# Patient Record
Sex: MALE | Race: OTHER
[De-identification: names, ages, dates, MRNs, and addresses within clinical notes are randomized per-mention and may not be internally consistent; named-entity substitution may affect disease eponyms.]

---

## 2019-11-19 ENCOUNTER — APPOINTMENT (OUTPATIENT)
Dept: ENDOCRINOLOGY | Facility: CLINIC | Age: 59
End: 2019-11-19
Payer: COMMERCIAL

## 2019-11-19 VITALS
SYSTOLIC BLOOD PRESSURE: 118 MMHG | HEIGHT: 72 IN | WEIGHT: 170 LBS | DIASTOLIC BLOOD PRESSURE: 80 MMHG | OXYGEN SATURATION: 97 % | BODY MASS INDEX: 23.03 KG/M2 | HEART RATE: 74 BPM

## 2019-11-19 DIAGNOSIS — Z83.3 FAMILY HISTORY OF DIABETES MELLITUS: ICD-10-CM

## 2019-11-19 DIAGNOSIS — Z80.0 FAMILY HISTORY OF MALIGNANT NEOPLASM OF DIGESTIVE ORGANS: ICD-10-CM

## 2019-11-19 DIAGNOSIS — Z80.3 FAMILY HISTORY OF MALIGNANT NEOPLASM OF BREAST: ICD-10-CM

## 2019-11-19 DIAGNOSIS — Z80.9 FAMILY HISTORY OF MALIGNANT NEOPLASM, UNSPECIFIED: ICD-10-CM

## 2019-11-19 DIAGNOSIS — Z78.9 OTHER SPECIFIED HEALTH STATUS: ICD-10-CM

## 2019-11-19 LAB — GLUCOSE BLDC GLUCOMTR-MCNC: 122

## 2019-11-19 PROCEDURE — 99205 OFFICE O/P NEW HI 60 MIN: CPT

## 2019-11-19 RX ORDER — IRBESARTAN 300 MG/1
TABLET, FILM COATED ORAL
Refills: 0 | Status: ACTIVE | COMMUNITY

## 2019-11-19 RX ORDER — BLOOD SUGAR DIAGNOSTIC
STRIP MISCELLANEOUS TWICE DAILY
Qty: 2 | Refills: 3 | Status: ACTIVE | COMMUNITY
Start: 2019-11-19 | End: 1900-01-01

## 2019-11-19 RX ORDER — BLOOD-GLUCOSE METER
W/DEVICE EACH MISCELLANEOUS
Qty: 1 | Refills: 0 | Status: ACTIVE | COMMUNITY
Start: 2019-11-19 | End: 1900-01-01

## 2019-11-19 RX ORDER — LANCETS 33 GAUGE
EACH MISCELLANEOUS
Qty: 1 | Refills: 0 | Status: ACTIVE | COMMUNITY
Start: 2019-11-19 | End: 1900-01-01

## 2019-11-19 RX ORDER — BLOOD-GLUCOSE METER
70 EACH MISCELLANEOUS
Qty: 1 | Refills: 3 | Status: ACTIVE | COMMUNITY
Start: 2019-11-19 | End: 1900-01-01

## 2019-11-19 NOTE — CONSULT LETTER
[Dear  ___] : Dear  [unfilled], [Consult Closing:] : Thank you very much for allowing me to participate in the care of this patient.  If you have any questions, please do not hesitate to contact me. [Please see my note below.] : Please see my note below. [Consult Letter:] : I had the pleasure of evaluating your patient, [unfilled]. [Sincerely,] : Sincerely, [FreeTextEntry3] : Angela Marie MD

## 2019-11-19 NOTE — PHYSICAL EXAM
[Alert] : alert [Well Nourished] : well nourished [No Acute Distress] : no acute distress [Well Developed] : well developed [Normal Rate and Effort] : normal respiratory rhythm and effort [EOMI] : extra ocular movement intact [No Respiratory Distress] : no respiratory distress [Normal Hearing] : hearing was normal [Clear to Auscultation] : lungs were clear to auscultation bilaterally [No Accessory Muscle Use] : no accessory muscle use [Normal Rate] : heart rate was normal  [Normal S1, S2] : normal S1 and S2 [Normal Bowel Sounds] : normal bowel sounds [Regular Rhythm] : with a regular rhythm [Not Tender] : non-tender [Normal Gait] : normal gait [No Joint Swelling] : no joint swelling seen [Soft] : abdomen soft [Foot Ulcers] : no foot ulcers [No Rash] : no rash [Normal Strength/Tone] : muscle strength and tone were normal [Right Foot Was Examined] : right foot ~C was examined [Left Foot Was Examined] : left foot ~C was examined [Tenderness] : tender [Normal] : normal [Swelling] : not swollen [2+] : 2+ in the posterior tibialis [Erythema] : not erythematous [#2 Diminished] : number 2 was normal [#3 Diminished] : number 3 was normal [#1 Diminished] : number 1 was normal [#4 Diminished] : number 4 was normal [#5 Diminished] : number 5 was normal [#8 Diminished] : number 8 was normal [#7 Diminished] : number 7 was normal [#6 Diminished] : number 6 was normal [#9 Diminished] : number 9 was normal [#10 Diminished] : number 10 was normal

## 2019-11-19 NOTE — HISTORY OF PRESENT ILLNESS
[FreeTextEntry1] : Patient is a 60 yo man with type 2 diabetes establishing endocrine care today\par \par Diabetes diagnosed 10 years ago on routine labs.  Dr. 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  Will eat Greek food, Italian, steakhouses about 3 times  a week. Eats dinner around 7 during the week and 8-9 PM on the weekends\par Snacks: yogurt at night or popcorn\par Prior to six weeks ago was eating candy, cakes, and fast food. Has a sweet tooth\par Patient does not cook, wife cooks\par Never overweight\par Goes to the gym, has a  twice a week\par Last A1c was 8.8%

## 2019-11-19 NOTE — ASSESSMENT
[FreeTextEntry1] : Patient is a 58 yo man with uncontrolled T2DM, HTN, HLD establishing new endocrine care\par \par 1. Uncontrolled T2DM (A1c of 8.8% October)\par -extensive discussion regarding the importance of glycemic control and potential risks of micro/macrovascular disease\par -recommend checking fingersticks intermittently at various times of the day. Glucometer sent to pharmacy along with supplies\par -A1c too early to check at this time, lab slips dated for January given today\par -educated about carbohydrate/sugar limits and decreasing restaurant foods if possible\par -nutrition consultation\par -offered zonia but declined as we were unsure whether insurance would cover professional zonia for this visit\par -up to date with dilated eye exam\par -monofilament testing normal today\par \par 2. HTN\par -BP goal < 140/90\par \par 3. HLD\par -LDL goal 70\par -repeat in January\par -statin\par \par Follow up in 3 months [Carbohydrate Consistent Diet] : carbohydrate consistent diet [Diabetes Foot Care] : diabetes foot care [Long Term Vascular Complications] : long term vascular complications of diabetes [Importance of Diet and Exercise] : importance of diet and exercise to improve glycemic control, achieve weight loss and improve cardiovascular health [Retinopathy Screening] : Patient was referred to ophthalmology for retinopathy screening [Self Monitoring of Blood Glucose] : self monitoring of blood glucose

## 2019-11-19 NOTE — REVIEW OF SYSTEMS
[Negative] : Eyes [All other systems negative] : All other systems negative [Fatigue] : no fatigue [Decreased Appetite] : appetite not decreased [Visual Field Defect] : no visual field defect [Neck Pain] : no neck pain [Blurry Vision] : no blurred vision [Chest Pain] : no chest pain [Palpitations] : no palpitations [Nasal Congestion] : no nasal congestion [Shortness Of Breath] : no shortness of breath [Heart Rate Is Fast] : the heart rate was not fast [Heart Rate Is Slow] : the heart rate was not slow [Cough] : no cough [Wheezing] : no wheezing was heard [Nausea] : no nausea [Vomiting] : no vomiting was observed [SOB on Exertion] : no shortness of breath during exertion [Constipation] : no constipation [Headache] : no headaches [Diarrhea] : no diarrhea [Depression] : no depression [Tremors] : no tremors [Anxiety] : no anxiety

## 2020-01-08 ENCOUNTER — APPOINTMENT (OUTPATIENT)
Dept: ENDOCRINOLOGY | Facility: CLINIC | Age: 60
End: 2020-01-08

## 2020-02-13 ENCOUNTER — APPOINTMENT (OUTPATIENT)
Dept: ENDOCRINOLOGY | Facility: CLINIC | Age: 60
End: 2020-02-13

## 2020-05-29 ENCOUNTER — TRANSCRIPTION ENCOUNTER (OUTPATIENT)
Age: 60
End: 2020-05-29

## 2020-08-14 ENCOUNTER — APPOINTMENT (OUTPATIENT)
Dept: ENDOCRINOLOGY | Facility: CLINIC | Age: 60
End: 2020-08-14
Payer: COMMERCIAL

## 2020-08-14 ENCOUNTER — RESULT CHARGE (OUTPATIENT)
Age: 60
End: 2020-08-14

## 2020-08-14 VITALS
DIASTOLIC BLOOD PRESSURE: 72 MMHG | SYSTOLIC BLOOD PRESSURE: 102 MMHG | HEART RATE: 72 BPM | BODY MASS INDEX: 22.75 KG/M2 | TEMPERATURE: 96.4 F | OXYGEN SATURATION: 98 % | WEIGHT: 168 LBS | HEIGHT: 72 IN

## 2020-08-14 LAB
GLUCOSE BLDC GLUCOMTR-MCNC: 157
HBA1C MFR BLD HPLC: 7.4

## 2020-08-14 PROCEDURE — 83036 HEMOGLOBIN GLYCOSYLATED A1C: CPT | Mod: QW

## 2020-08-14 PROCEDURE — 99214 OFFICE O/P EST MOD 30 MIN: CPT | Mod: 25

## 2020-08-14 PROCEDURE — 82962 GLUCOSE BLOOD TEST: CPT

## 2020-08-14 NOTE — PHYSICAL EXAM
[Alert] : alert [Well Nourished] : well nourished [Healthy Appearance] : healthy appearance [EOMI] : extra ocular movement intact [Normal Hearing] : hearing was normal [No Respiratory Distress] : no respiratory distress [No Accessory Muscle Use] : no accessory muscle use [Normal Rate and Effort] : normal respiratory rate and effort [Normal Rate] : heart rate was normal [Normal Gait] : normal gait [No Motor Deficits] : the motor exam was normal [Normal Affect] : the affect was normal [Normal Insight/Judgement] : insight and judgment were intact [Normal Mood] : the mood was normal

## 2020-08-14 NOTE — HISTORY OF PRESENT ILLNESS
[FreeTextEntry1] : Patient is a 59 yo man with type 2 diabetes, last visit November 2019, here for follow up\par \par Diabetes diagnosed 10 years ago on routine labs. Dr. Serna was previous endocrinologist- looking to change care.\par Dilated eye exam: early retinopathy, one year ago\par Current diabetes medicine is Kombiglyze twice a day\par Does not check sugars, knows how to check sugars in theory\par Breakfast: peanut butter sandwich on potato bread; Cheerios, banana\par Lunch: sandwich, processed meat\par Dinner: pasta + chicken Parmesan\par Snacks: yogurt at night or popcorn, apple; rare desserts but occasional sweets\par "I'm so weak, when I eat healthy, I lose weight."\par Patient does not cook, wife cooks\par Never overweight\par Goes to the gym, has a  twice a week\par Last A1c was 8.8% \par \par HLD: lipitor\par

## 2020-08-14 NOTE — ASSESSMENT
[FreeTextEntry1] : Patient is a 61 yo man with type 2 diabetes, HTN and HLD\par \par 1. T2DM\par -patient's A1c on POCT is 7.4%, confirm with serum\par -diet is very liberal and includes a lot of carbohydrates, processed foods\par -although A1c is relatively at goal, educated about the risks of medicines and ultimate goal of coming off diabetes medicines and maintaining health with healthy diet\par -nutrition education to be scheduled\par -requires annual dilated eye exam\par -check microalbumin/creatinine today\par -check CMP\par -risk of GI side effects and hypoglycemia with medicine Kombiglyze discussed\par \par 2. HLD\par -check lipid profile\par -continue with statin\par \par 3. HTN\par -BP goal < 140/90\par \par He is aware I am leaving the practice. Follow up with new 865 doctor in four months [Long Term Vascular Complications] : long term vascular complications of diabetes [Carbohydrate Consistent Diet] : carbohydrate consistent diet [Importance of Diet and Exercise] : importance of diet and exercise to improve glycemic control, achieve weight loss and improve cardiovascular health [Exercise/Effect on Glucose] : exercise/effect on glucose [Hypoglycemia Management] : hypoglycemia management [Self Monitoring of Blood Glucose] : self monitoring of blood glucose [Retinopathy Screening] : Patient was referred to ophthalmology for retinopathy screening

## 2020-08-14 NOTE — REVIEW OF SYSTEMS
[Fatigue] : no fatigue [Decreased Appetite] : appetite not decreased [Dysphagia] : no dysphagia [Dysphonia] : no dysphonia [Chest Pain] : no chest pain [Slow Heart Rate] : heart rate is not slow [Palpitations] : no palpitations [Fast Heart Rate] : heart rate is not fast [Nausea] : no nausea [Constipation] : no constipation [Vomiting] : no vomiting [Diarrhea] : no diarrhea [Polyuria] : no polyuria [Joint Pain] : no joint pain

## 2020-08-17 LAB
ALBUMIN SERPL ELPH-MCNC: 5.2 G/DL
ALP BLD-CCNC: 79 U/L
ALT SERPL-CCNC: 34 U/L
ANION GAP SERPL CALC-SCNC: 14 MMOL/L
AST SERPL-CCNC: 21 U/L
BILIRUB SERPL-MCNC: 0.9 MG/DL
BUN SERPL-MCNC: 17 MG/DL
CALCIUM SERPL-MCNC: 9.9 MG/DL
CHLORIDE SERPL-SCNC: 99 MMOL/L
CHOLEST SERPL-MCNC: 179 MG/DL
CHOLEST/HDLC SERPL: 3.4 RATIO
CO2 SERPL-SCNC: 26 MMOL/L
CREAT SERPL-MCNC: 0.92 MG/DL
CREAT SPEC-SCNC: 151 MG/DL
ESTIMATED AVERAGE GLUCOSE: 177 MG/DL
GLUCOSE SERPL-MCNC: 171 MG/DL
HBA1C MFR BLD HPLC: 7.8 %
HDLC SERPL-MCNC: 52 MG/DL
LDLC SERPL CALC-MCNC: 99 MG/DL
MICROALBUMIN 24H UR DL<=1MG/L-MCNC: 2 MG/DL
MICROALBUMIN/CREAT 24H UR-RTO: 13 MG/G
POTASSIUM SERPL-SCNC: 4.7 MMOL/L
PROT SERPL-MCNC: 7.6 G/DL
SODIUM SERPL-SCNC: 138 MMOL/L
TRIGL SERPL-MCNC: 141 MG/DL

## 2020-09-28 ENCOUNTER — APPOINTMENT (OUTPATIENT)
Dept: ENDOCRINOLOGY | Facility: CLINIC | Age: 60
End: 2020-09-28

## 2020-12-02 ENCOUNTER — APPOINTMENT (OUTPATIENT)
Dept: ENDOCRINOLOGY | Facility: CLINIC | Age: 60
End: 2020-12-02
Payer: COMMERCIAL

## 2020-12-02 VITALS
TEMPERATURE: 97.3 F | SYSTOLIC BLOOD PRESSURE: 131 MMHG | OXYGEN SATURATION: 99 % | HEIGHT: 72 IN | HEART RATE: 72 BPM | DIASTOLIC BLOOD PRESSURE: 83 MMHG | WEIGHT: 170 LBS | BODY MASS INDEX: 23.03 KG/M2

## 2020-12-02 LAB — HBA1C MFR BLD HPLC: 7

## 2020-12-02 PROCEDURE — 99214 OFFICE O/P EST MOD 30 MIN: CPT | Mod: 25

## 2020-12-02 PROCEDURE — 83036 HEMOGLOBIN GLYCOSYLATED A1C: CPT | Mod: QW

## 2020-12-02 PROCEDURE — 99072 ADDL SUPL MATRL&STAF TM PHE: CPT

## 2020-12-02 NOTE — PHYSICAL EXAM
[Alert] : alert [Well Nourished] : well nourished [No Acute Distress] : no acute distress [EOMI] : extra ocular movement intact [Normal Hearing] : hearing was normal [No Respiratory Distress] : no respiratory distress [No Accessory Muscle Use] : no accessory muscle use [Clear to Auscultation] : lungs were clear to auscultation bilaterally [Normal to Percussion] : lungs were normal to percussion [Normal S1, S2] : normal S1 and S2 [Normal Rate] : heart rate was normal [Normal Bowel Sounds] : normal bowel sounds [Soft] : abdomen soft [Normal Gait] : normal gait [No Joint Swelling] : no joint swelling seen [Normal Strength/Tone] : muscle strength and tone were normal [No Motor Deficits] : the motor exam was normal [Normal Affect] : the affect was normal [Normal Insight/Judgement] : insight and judgment were intact [Normal Mood] : the mood was normal [de-identified] : ? left thyroid nodule

## 2020-12-02 NOTE — HISTORY OF PRESENT ILLNESS
[FreeTextEntry1] : Patient is a 61 yo man with type 2 diabetes here for follow up\par \par Diabetes diagnosed 10 years ago on routine labs. Dr. Serna was previous endocrinologist\par Dilated eye exam: early retinopathy, one year ago\par Current diabetes medicine is Kombiglyze twice a day\par Does not check sugars, knows how to check sugars in theory. States "I just can't"\par Breakfast: black coffee with splenda and a "diet" chocolate muffin\par Lunch: home made sandwich, chicken cutlet on Qinti bread\par Dinner: lentil soup, Hawaiian chicken\par Snacks: apple, banana\par States diet hasn't been the healthiest.  Has a sweet tooth and likes anything sweet.  After dinner he "has to have something sweet."\par Patient does not cook, wife cooks\par POCT A1c in August was 7.4%, serum A1c was 7.8%\par Has not had a dilated eye exam yet thought he knows to go \par \par HLD: lipitor\par

## 2020-12-02 NOTE — ASSESSMENT
[FreeTextEntry1] : Patient is a 59 yo man with type 2 diabetes, HTN, HLD and possible left thyroid nodule\par \par 1. Type 2 diabetes\par -POCT A1c today is 7% which is improved since the last visit. Confirm with serum A1c\par -had no nephropathy on screening and in an effort to decrease pill burden can do a trial off of avapro\par -continue with Kombiglyze. If serum A1c is at goal of 7%, no addition of medicines needed\par -educated about limiting sweets. Diet over all is not bad but the sweet tooth can be a problem.  Would limit things like muffins if possible, optimize diets and have complex carbohydrates\par -requires dilated eye exam\par -monofilament testing was done and normal at the last visit\par -microalbumin/creatinine level was checked this year, repeat next year\par \par 2. HTN\par -BP at goal \par \par 3. HLD\par -statin\par \par 4. Possible left thyroid nodule on exam\par -check TSH \par -refer for thyroid US\par \par Follow up in 4 months, sooner if needed [Diabetes Foot Care] : diabetes foot care [Long Term Vascular Complications] : long term vascular complications of diabetes [Carbohydrate Consistent Diet] : carbohydrate consistent diet [Importance of Diet and Exercise] : importance of diet and exercise to improve glycemic control, achieve weight loss and improve cardiovascular health [Exercise/Effect on Glucose] : exercise/effect on glucose [Self Monitoring of Blood Glucose] : self monitoring of blood glucose [Retinopathy Screening] : Patient was referred to ophthalmology for retinopathy screening

## 2020-12-02 NOTE — REVIEW OF SYSTEMS
[Fatigue] : no fatigue [Decreased Appetite] : appetite not decreased [Dysphagia] : no dysphagia [Dysphonia] : no dysphonia [Chest Pain] : no chest pain [Slow Heart Rate] : heart rate is not slow [Palpitations] : no palpitations [Fast Heart Rate] : heart rate is not fast [Nausea] : no nausea [Constipation] : no constipation [Vomiting] : no vomiting [Headaches] : no headaches [Tremors] : no tremors [Depression] : no depression [Stress] : no stress [Cold Intolerance] : no cold intolerance [Heat Intolerance] : no heat intolerance

## 2020-12-03 LAB
ALBUMIN SERPL ELPH-MCNC: 5.2 G/DL
ALP BLD-CCNC: 79 U/L
ALT SERPL-CCNC: 28 U/L
ANION GAP SERPL CALC-SCNC: 21 MMOL/L
AST SERPL-CCNC: 24 U/L
BILIRUB SERPL-MCNC: 0.6 MG/DL
BUN SERPL-MCNC: 15 MG/DL
CALCIUM SERPL-MCNC: 10.2 MG/DL
CHLORIDE SERPL-SCNC: 94 MMOL/L
CHOLEST SERPL-MCNC: 185 MG/DL
CO2 SERPL-SCNC: 22 MMOL/L
CREAT SERPL-MCNC: 0.93 MG/DL
ESTIMATED AVERAGE GLUCOSE: 169 MG/DL
GLUCOSE SERPL-MCNC: 107 MG/DL
HBA1C MFR BLD HPLC: 7.5 %
HDLC SERPL-MCNC: 51 MG/DL
LDLC SERPL CALC-MCNC: 100 MG/DL
NONHDLC SERPL-MCNC: 134 MG/DL
POTASSIUM SERPL-SCNC: 4.4 MMOL/L
PROT SERPL-MCNC: 7.9 G/DL
SODIUM SERPL-SCNC: 138 MMOL/L
T3 SERPL-MCNC: 87 NG/DL
T4 FREE SERPL-MCNC: 1.6 NG/DL
TRIGL SERPL-MCNC: 166 MG/DL
TSH SERPL-ACNC: 1 UIU/ML

## 2020-12-07 ENCOUNTER — RESULT REVIEW (OUTPATIENT)
Age: 60
End: 2020-12-07

## 2020-12-07 ENCOUNTER — OUTPATIENT (OUTPATIENT)
Dept: OUTPATIENT SERVICES | Facility: HOSPITAL | Age: 60
LOS: 1 days | End: 2020-12-07
Payer: COMMERCIAL

## 2020-12-07 ENCOUNTER — APPOINTMENT (OUTPATIENT)
Dept: ULTRASOUND IMAGING | Facility: CLINIC | Age: 60
End: 2020-12-07
Payer: COMMERCIAL

## 2020-12-07 DIAGNOSIS — Z00.8 ENCOUNTER FOR OTHER GENERAL EXAMINATION: ICD-10-CM

## 2020-12-07 PROCEDURE — 76536 US EXAM OF HEAD AND NECK: CPT | Mod: 26

## 2020-12-07 PROCEDURE — 76536 US EXAM OF HEAD AND NECK: CPT

## 2020-12-14 ENCOUNTER — APPOINTMENT (OUTPATIENT)
Dept: ENDOCRINOLOGY | Facility: CLINIC | Age: 60
End: 2020-12-14

## 2021-04-06 ENCOUNTER — APPOINTMENT (OUTPATIENT)
Dept: ENDOCRINOLOGY | Facility: CLINIC | Age: 61
End: 2021-04-06

## 2021-04-06 ENCOUNTER — APPOINTMENT (OUTPATIENT)
Dept: ENDOCRINOLOGY | Facility: CLINIC | Age: 61
End: 2021-04-06
Payer: COMMERCIAL

## 2021-04-06 VITALS
OXYGEN SATURATION: 96 % | HEART RATE: 81 BPM | HEIGHT: 72 IN | TEMPERATURE: 97.7 F | SYSTOLIC BLOOD PRESSURE: 128 MMHG | DIASTOLIC BLOOD PRESSURE: 79 MMHG | WEIGHT: 169 LBS | BODY MASS INDEX: 22.89 KG/M2

## 2021-04-06 LAB — HBA1C MFR BLD HPLC: 8.7

## 2021-04-06 PROCEDURE — 83036 HEMOGLOBIN GLYCOSYLATED A1C: CPT | Mod: QW

## 2021-04-06 PROCEDURE — 99215 OFFICE O/P EST HI 40 MIN: CPT | Mod: 25

## 2021-04-06 PROCEDURE — 99072 ADDL SUPL MATRL&STAF TM PHE: CPT

## 2021-04-06 NOTE — ASSESSMENT
[FreeTextEntry1] : Patient is a 40 yo man with type 2 diabetes, HLD and thyroid nodule here for follow up\par \par 1. Type 2 diabetes\par -POCT A1c today is 8.7% with admittedly poor diet\par -discussed the risks of uncontrolled diabetes at length and offered nutrition counseling\par -schedule CDE visit\par -patient does not check fingersticks\par -his wife states patient has had some unintentional weight loss.  Patient has PCP visit with Dr. Shah and would consider age appropriate cancer screening, possible CT scan\par -will check c-peptide levels today\par -confirm serum A1c, serum CMP and repeat microalbumin/creatinine ratio\par -to date, behavioral modifications trials has not aided in achieving glycemic goals\par -will add glipizide daily.  Hypoglycemia risks discussed. Continue with Kombiglyze. Offered SLGT 2 inhibitors but he had increased urinary frequency with jardiance in the past and declines a retrial\par \par 2. Left thyroid nodule\par -patient has a 1 cm isoechoic right thyroid nodule with a left lobe that is homogenous no distinct nodule\par -repeat thyroid US this fall for active surveillance\par -clinically and chemically euthyroid\par \par 3. HLD\par -recommended statin dose is 40 mg but patient was only taking half a pill\par -increase to 40 mg as discussed\par -LDL goal <70\par \par Follow up in 3 months, [address] [place] [address] tasked to schedule nutrition visit. [Diabetes Foot Care] : diabetes foot care [Long Term Vascular Complications] : long term vascular complications of diabetes [Carbohydrate Consistent Diet] : carbohydrate consistent diet [Importance of Diet and Exercise] : importance of diet and exercise to improve glycemic control, achieve weight loss and improve cardiovascular health [Exercise/Effect on Glucose] : exercise/effect on glucose [Self Monitoring of Blood Glucose] : self monitoring of blood glucose [Retinopathy Screening] : Patient was referred to ophthalmology for retinopathy screening

## 2021-04-06 NOTE — HISTORY OF PRESENT ILLNESS
[FreeTextEntry1] : Patient is a 62 yo man with type 2 diabetes and thyroid nodule here for follow up\par \par Diabetes diagnosed around 2010 on routine labs. Dr. Serna was previous endocrinologist\par Dilated eye exam: early retinopathy, one year ago\par Current diabetes medicine is Kombiglyze (saxaglipitin/metformin) twice a day. Had tried Jardiance in the past but caused increase urination.  \par Does not check sugars, knows how to check sugars in theory. States "I just can't"\par Is not eating a lot but "eating poorly."  He has been told that he looks thin but feels fine.  Father had colon cancer at 56 and has maintained colon cancer screenings. Appetite is unchanged, no stomach ache. Never a smoker. Weight is around 163 and 166 on home scale. Has PCP visit with Dr. Ming Shah coming up April 14.\par Breakfast: black coffee with milk, no sweetener, apple\par Lunch: home made sandwich, crispy chicken sandwich from a cafe; occasional [employer] a twice a month; french fries on the side.  Ate about 1/2 the fries and wife ate the other half\par Dinner: lentil soup, Hawaiian chicken\par Snacks: apple, banana\par Sneaking in some mini muffins and desserts\par Patient does not cook, wife cooks\par POCT A1c in August was 7.4%, serum A1c was 7.5% in December. Offered additive diabetes medicines to achieve goal A1c of 7% but patient declined in favor of behavioral modifications.\par December 3,2021 \par A1c 7.5%\par \par HLD: \par LDL in December was above the goal of 70 at 135 and atorvastatin was increased from 10 to 40 mg.  States he is taking 1/2 pill and will start to take 40 mg from today\par \par Thyroid nodule\par Patient with a 1 x 0.7 x 0.9 cm isoechoic right midpole thyroid nodule\par Clinically and chemically euthyroid in December 2020

## 2021-04-06 NOTE — PHYSICAL EXAM
[Alert] : alert [Well Nourished] : well nourished [No Acute Distress] : no acute distress [EOMI] : extra ocular movement intact [Normal Hearing] : hearing was normal [No Respiratory Distress] : no respiratory distress [No Accessory Muscle Use] : no accessory muscle use [Clear to Auscultation] : lungs were clear to auscultation bilaterally [Normal S1, S2] : normal S1 and S2 [Normal Rate] : heart rate was normal [Normal Bowel Sounds] : normal bowel sounds [Not Tender] : non-tender [Soft] : abdomen soft [No Stigmata of Cushings Syndrome] : no stigmata of Cushings Syndrome [Normal Gait] : normal gait [Foot Ulcers] : no foot ulcers [Right Foot Was Examined] : right foot ~C was examined [Left Foot Was Examined] : left foot ~C was examined [Normal] : normal [Full ROM] : with full range of motion [Swelling] : not swollen [Tenderness] : not tender [Erythema] : not erythematous [2+] : 2+ in the dorsalis pedis [#1 Diminished] : number 1 was normal [#2 Diminished] : number 2 was normal [#3 Diminished] : number 3 was normal [#4 Diminished] : number 4 was normal [#5 Diminished] : number 5 was normal [#6 Diminished] : number 6 was normal [#7 Diminished] : number 7 was normal [#8 Diminished] : number 8 was normal [#9 Diminished] : number 9 was normal [#10 Diminished] : number 10 was normal [No Motor Deficits] : the motor exam was normal [Normal Affect] : the affect was normal [Normal Insight/Judgement] : insight and judgment were intact [Normal Mood] : the mood was normal

## 2021-04-07 LAB
ALBUMIN SERPL ELPH-MCNC: 5 G/DL
ALP BLD-CCNC: 89 U/L
ALT SERPL-CCNC: 24 U/L
ANION GAP SERPL CALC-SCNC: 16 MMOL/L
AST SERPL-CCNC: 20 U/L
BILIRUB SERPL-MCNC: 0.9 MG/DL
BUN SERPL-MCNC: 16 MG/DL
C PEPTIDE SERPL-MCNC: 5.2 NG/ML
CALCIUM SERPL-MCNC: 10.2 MG/DL
CHLORIDE SERPL-SCNC: 97 MMOL/L
CHOLEST SERPL-MCNC: 149 MG/DL
CO2 SERPL-SCNC: 27 MMOL/L
CREAT SERPL-MCNC: 1.03 MG/DL
CREAT SPEC-SCNC: 517 MG/DL
ESTIMATED AVERAGE GLUCOSE: 223 MG/DL
GLUCOSE SERPL-MCNC: 296 MG/DL
HBA1C MFR BLD HPLC: 9.4 %
HDLC SERPL-MCNC: 45 MG/DL
LDLC SERPL CALC-MCNC: 66 MG/DL
MICROALBUMIN 24H UR DL<=1MG/L-MCNC: 9.9 MG/DL
MICROALBUMIN/CREAT 24H UR-RTO: 19 MG/G
NONHDLC SERPL-MCNC: 104 MG/DL
POTASSIUM SERPL-SCNC: 4.5 MMOL/L
PROT SERPL-MCNC: 7.3 G/DL
SODIUM SERPL-SCNC: 140 MMOL/L
T4 FREE SERPL-MCNC: 1.6 NG/DL
TRIGL SERPL-MCNC: 188 MG/DL
TSH SERPL-ACNC: 0.91 UIU/ML

## 2021-04-10 ENCOUNTER — APPOINTMENT (OUTPATIENT)
Dept: CT IMAGING | Facility: CLINIC | Age: 61
End: 2021-04-10

## 2021-04-26 ENCOUNTER — APPOINTMENT (OUTPATIENT)
Dept: ENDOCRINOLOGY | Facility: CLINIC | Age: 61
End: 2021-04-26
Payer: COMMERCIAL

## 2021-04-26 PROCEDURE — 97802 MEDICAL NUTRITION INDIV IN: CPT | Mod: 95

## 2021-06-21 ENCOUNTER — APPOINTMENT (OUTPATIENT)
Dept: ENDOCRINOLOGY | Facility: CLINIC | Age: 61
End: 2021-06-21

## 2021-08-24 ENCOUNTER — APPOINTMENT (OUTPATIENT)
Dept: ENDOCRINOLOGY | Facility: CLINIC | Age: 61
End: 2021-08-24
Payer: COMMERCIAL

## 2021-08-24 VITALS
HEART RATE: 84 BPM | WEIGHT: 164 LBS | BODY MASS INDEX: 22.21 KG/M2 | TEMPERATURE: 97.2 F | HEIGHT: 72 IN | DIASTOLIC BLOOD PRESSURE: 99 MMHG | SYSTOLIC BLOOD PRESSURE: 144 MMHG

## 2021-08-24 LAB — HBA1C MFR BLD HPLC: 8.3

## 2021-08-24 PROCEDURE — 99214 OFFICE O/P EST MOD 30 MIN: CPT

## 2021-08-24 RX ORDER — GLIPIZIDE 2.5 MG/1
2.5 TABLET, EXTENDED RELEASE ORAL DAILY
Qty: 90 | Refills: 3 | Status: DISCONTINUED | COMMUNITY
Start: 2021-04-06 | End: 2021-08-24

## 2021-08-24 NOTE — REVIEW OF SYSTEMS
[Fatigue] : no fatigue [Decreased Appetite] : appetite not decreased [Visual Field Defect] : no visual field defect [Dysphagia] : no dysphagia [Dysphonia] : no dysphonia [Chest Pain] : no chest pain [Palpitations] : no palpitations [Nausea] : no nausea [Vomiting] : no vomiting [Joint Pain] : no joint pain [Depression] : no depression

## 2021-08-24 NOTE — PHYSICAL EXAM
[Alert] : alert [No Acute Distress] : no acute distress [EOMI] : extra ocular movement intact [Normal Hearing] : hearing was normal [Thyroid Not Enlarged] : the thyroid was not enlarged [No Respiratory Distress] : no respiratory distress [No Accessory Muscle Use] : no accessory muscle use [Clear to Auscultation] : lungs were clear to auscultation bilaterally [Normal S1, S2] : normal S1 and S2 [Normal Rate] : heart rate was normal [No Edema] : no peripheral edema [Normal Bowel Sounds] : normal bowel sounds [Soft] : abdomen soft [Not Tender] : non-tender [No Motor Deficits] : the motor exam was normal [Normal Affect] : the affect was normal [Normal Insight/Judgement] : insight and judgment were intact [Normal Mood] : the mood was normal

## 2021-08-24 NOTE — ASSESSMENT
[FreeTextEntry1] : Patient is a 60 yo man with type 2 diabetes, HLD and thyroid nodule here for follow up\par \par 1. Type 2 diabetes\par -POCT A1c today is 8.3% improved from last visit yet not at goal\par -discussed the risks of uncontrolled diabetes at length and offered nutrition counseling. He had telehealth nutritional counseling but states it was not helpful\par -patient does not check fingersticks; encouraged him to do so\par -states that he was watching diet strictly for a few months but had more liberal food choices recently\par -check serum A1c, serum CMP and repeat microalbumin/creatinine ratio at next visit\par -to date, behavioral modifications trials has not aided in achieving glycemic goals\par -non-adherent to glipizide daily-admits to taking it only twice a week, can stop at this point. Continue with Kombiglyze. Offered SLGT 2 inhibitors and retrial of low dose jardiance 10 mg. He is amenable.  Discussed side effects including increased urination which he experienced in the past. Recommend he take it when he gets home from work and has access to bathroom nearby.  If Jardiance does not work, we are running out of options as he is already on three classes.  One possibility would be actos.  We can consider this if A1c is not at goal\par \par 2. Left thyroid nodule\par -patient has a 1 cm isoechoic right thyroid nodule with a left lobe that is homogenous no distinct nodule\par -repeat thyroid US this December, referral provided\par -clinically and chemically euthyroid\par \par 3. HLD\par -continue atorvastatin 40 mg\par -LDL goal <70\par \par 4. HTN\par -BP goal < 140/90\par \par Follow up in 3 months, sooner if needed [Long Term Vascular Complications] : long term vascular complications of diabetes [Carbohydrate Consistent Diet] : carbohydrate consistent diet [Importance of Diet and Exercise] : importance of diet and exercise to improve glycemic control, achieve weight loss and improve cardiovascular health [Exercise/Effect on Glucose] : exercise/effect on glucose [Self Monitoring of Blood Glucose] : self monitoring of blood glucose [Retinopathy Screening] : Patient was referred to ophthalmology for retinopathy screening

## 2021-09-06 ENCOUNTER — TRANSCRIPTION ENCOUNTER (OUTPATIENT)
Age: 61
End: 2021-09-06

## 2021-11-29 ENCOUNTER — RX RENEWAL (OUTPATIENT)
Age: 61
End: 2021-11-29

## 2021-12-23 ENCOUNTER — APPOINTMENT (OUTPATIENT)
Dept: ENDOCRINOLOGY | Facility: CLINIC | Age: 61
End: 2021-12-23
Payer: COMMERCIAL

## 2021-12-23 VITALS
BODY MASS INDEX: 20.79 KG/M2 | OXYGEN SATURATION: 98 % | HEART RATE: 71 BPM | DIASTOLIC BLOOD PRESSURE: 89 MMHG | SYSTOLIC BLOOD PRESSURE: 150 MMHG | WEIGHT: 162 LBS | HEIGHT: 74 IN | TEMPERATURE: 97.2 F

## 2021-12-23 VITALS — SYSTOLIC BLOOD PRESSURE: 131 MMHG | DIASTOLIC BLOOD PRESSURE: 86 MMHG

## 2021-12-23 DIAGNOSIS — Z00.00 ENCOUNTER FOR GENERAL ADULT MEDICAL EXAMINATION W/OUT ABNORMAL FINDINGS: ICD-10-CM

## 2021-12-23 LAB — HBA1C MFR BLD HPLC: 8

## 2021-12-23 PROCEDURE — 99214 OFFICE O/P EST MOD 30 MIN: CPT

## 2021-12-23 PROCEDURE — 83036 HEMOGLOBIN GLYCOSYLATED A1C: CPT | Mod: QW

## 2021-12-23 NOTE — REVIEW OF SYSTEMS
[Polyuria] : polyuria [Fatigue] : no fatigue [Decreased Appetite] : appetite not decreased [Visual Field Defect] : no visual field defect [Dysphagia] : no dysphagia [Dysphonia] : no dysphonia [Chest Pain] : no chest pain [Slow Heart Rate] : heart rate is not slow [Palpitations] : no palpitations [Fast Heart Rate] : heart rate is not fast [Shortness Of Breath] : no shortness of breath [Cough] : no cough [Nausea] : no nausea [Constipation] : no constipation [Vomiting] : no vomiting [Diarrhea] : no diarrhea [Headaches] : no headaches [Tremors] : no tremors [Depression] : no depression [Cold Intolerance] : no cold intolerance [Heat Intolerance] : no heat intolerance

## 2021-12-23 NOTE — HISTORY OF PRESENT ILLNESS
[FreeTextEntry1] : Patient is a 60 yo man with type 2 diabetes and thyroid nodule here for follow up\par \par Diabetes diagnosed around 2010 on routine labs. \par Current diabetes medicine is Kombiglyze (saxagliptin/metformin) twice a day and jardiance 10 mg daily\par Does not check sugars, knows how to check sugars in theory. States "I just can't"\par Since the last visit, overall diet has improved. Cut out cookies but still eating out at restaurants frequently. Over the summer, there has been more social gatherings and he might indulge during these times\par Breakfast: black coffee with milk, no sweetener, apple\par Lunch: home made sandwich, crispy chicken sandwich from a cafe; ate pizza all week\par Dinner: lentil soup, Hawaiian chicken; wife will offer meatballs and zucchini but he "needs spaghetti."\par Snacks: apple, banana\par Patient does not cook, wife cooks but stopped cooking over the summer. Likes eating out at restaurants\par Up to date with colonoscopy and age appropriate cancer screening as deemed by PCP\par Dilated eye exam: August 2021\par \par HLD: \par LDL in December was above the goal of 70 at 135 and atorvastatin was increased from 10 to 40 mg. States he is taking 1/2 pill and will start to take 40 mg from today\par \par Thyroid nodule\par Patient with a 1 x 0.7 x 0.9 cm isoechoic right midpole thyroid nodule\par Clinically and chemically euthyroid in December 2020 \par

## 2021-12-23 NOTE — PHYSICAL EXAM
[Alert] : alert [Well Nourished] : well nourished [Healthy Appearance] : healthy appearance [No Acute Distress] : no acute distress [EOMI] : extra ocular movement intact [Normal Hearing] : hearing was normal [Thyroid Not Enlarged] : the thyroid was not enlarged [No Respiratory Distress] : no respiratory distress [No Accessory Muscle Use] : no accessory muscle use [Clear to Auscultation] : lungs were clear to auscultation bilaterally [Normal S1, S2] : normal S1 and S2 [Normal Rate] : heart rate was normal [No Edema] : no peripheral edema [Normal Bowel Sounds] : normal bowel sounds [Not Tender] : non-tender [Soft] : abdomen soft [Normal Gait] : normal gait [Normal Strength/Tone] : muscle strength and tone were normal [No Motor Deficits] : the motor exam was normal [Normal Affect] : the affect was normal [Normal Insight/Judgement] : insight and judgment were intact [Normal Mood] : the mood was normal

## 2021-12-27 LAB
ANION GAP SERPL CALC-SCNC: 18 MMOL/L
BUN SERPL-MCNC: 15 MG/DL
CALCIUM SERPL-MCNC: 10.4 MG/DL
CHLORIDE SERPL-SCNC: 99 MMOL/L
CHOLEST SERPL-MCNC: 148 MG/DL
CO2 SERPL-SCNC: 24 MMOL/L
CREAT SERPL-MCNC: 0.96 MG/DL
CREAT SPEC-SCNC: 88 MG/DL
ESTIMATED AVERAGE GLUCOSE: 203 MG/DL
GLUCOSE SERPL-MCNC: 153 MG/DL
HBA1C MFR BLD HPLC: 8.7 %
HDLC SERPL-MCNC: 48 MG/DL
LDLC SERPL CALC-MCNC: 74 MG/DL
MICROALBUMIN 24H UR DL<=1MG/L-MCNC: 1.9 MG/DL
MICROALBUMIN/CREAT 24H UR-RTO: 22 MG/G
NONHDLC SERPL-MCNC: 100 MG/DL
POTASSIUM SERPL-SCNC: 4.8 MMOL/L
SODIUM SERPL-SCNC: 140 MMOL/L
TRIGL SERPL-MCNC: 130 MG/DL
TSH SERPL-ACNC: 1.11 UIU/ML

## 2022-03-16 ENCOUNTER — RX RENEWAL (OUTPATIENT)
Age: 62
End: 2022-03-16

## 2022-03-24 ENCOUNTER — APPOINTMENT (OUTPATIENT)
Dept: ENDOCRINOLOGY | Facility: CLINIC | Age: 62
End: 2022-03-24
Payer: COMMERCIAL

## 2022-03-24 ENCOUNTER — NON-APPOINTMENT (OUTPATIENT)
Age: 62
End: 2022-03-24

## 2022-03-24 VITALS
WEIGHT: 161 LBS | TEMPERATURE: 97 F | HEART RATE: 80 BPM | SYSTOLIC BLOOD PRESSURE: 142 MMHG | DIASTOLIC BLOOD PRESSURE: 84 MMHG | HEIGHT: 71 IN | OXYGEN SATURATION: 98 % | BODY MASS INDEX: 22.54 KG/M2

## 2022-03-24 DIAGNOSIS — I10 ESSENTIAL (PRIMARY) HYPERTENSION: ICD-10-CM

## 2022-03-24 LAB — HBA1C MFR BLD HPLC: 7.5

## 2022-03-24 PROCEDURE — 99214 OFFICE O/P EST MOD 30 MIN: CPT

## 2022-03-24 PROCEDURE — 83036 HEMOGLOBIN GLYCOSYLATED A1C: CPT | Mod: QW

## 2022-03-25 LAB
ANION GAP SERPL CALC-SCNC: 18 MMOL/L
BUN SERPL-MCNC: 19 MG/DL
CALCIUM SERPL-MCNC: 10.9 MG/DL
CHLORIDE SERPL-SCNC: 98 MMOL/L
CO2 SERPL-SCNC: 26 MMOL/L
CREAT SERPL-MCNC: 0.97 MG/DL
EGFR: 87 ML/MIN/1.73M2
ESTIMATED AVERAGE GLUCOSE: 192 MG/DL
GLUCOSE SERPL-MCNC: 139 MG/DL
HBA1C MFR BLD HPLC: 8.3 %
POTASSIUM SERPL-SCNC: 4.6 MMOL/L
SODIUM SERPL-SCNC: 142 MMOL/L

## 2022-03-25 RX ORDER — EMPAGLIFLOZIN 25 MG/1
25 TABLET, FILM COATED ORAL DAILY
Qty: 1 | Refills: 1 | Status: DISCONTINUED | COMMUNITY
Start: 2021-08-24 | End: 2022-03-25

## 2022-03-28 ENCOUNTER — RX RENEWAL (OUTPATIENT)
Age: 62
End: 2022-03-28

## 2022-03-31 RX ORDER — SAXAGLIPTIN AND METFORMIN HYDROCHLORIDE 2.5; 1 MG/1; MG/1
2.5-1 TABLET, FILM COATED, EXTENDED RELEASE ORAL
Qty: 180 | Refills: 3 | Status: DISCONTINUED | COMMUNITY
Start: 2022-03-28 | End: 2022-03-31

## 2022-08-19 ENCOUNTER — APPOINTMENT (OUTPATIENT)
Dept: ENDOCRINOLOGY | Facility: CLINIC | Age: 62
End: 2022-08-19

## 2022-08-19 VITALS
OXYGEN SATURATION: 98 % | DIASTOLIC BLOOD PRESSURE: 109 MMHG | BODY MASS INDEX: 23.1 KG/M2 | HEART RATE: 73 BPM | SYSTOLIC BLOOD PRESSURE: 167 MMHG | TEMPERATURE: 97.4 F | WEIGHT: 165 LBS | HEIGHT: 71 IN

## 2022-08-19 VITALS — DIASTOLIC BLOOD PRESSURE: 84 MMHG | SYSTOLIC BLOOD PRESSURE: 130 MMHG

## 2022-08-19 PROBLEM — I10 BENIGN ESSENTIAL HYPERTENSION: Status: ACTIVE | Noted: 2019-11-19

## 2022-08-19 LAB — HBA1C MFR BLD HPLC: 8.1

## 2022-08-19 PROCEDURE — 99214 OFFICE O/P EST MOD 30 MIN: CPT | Mod: 25

## 2022-08-19 PROCEDURE — 83036 HEMOGLOBIN GLYCOSYLATED A1C: CPT | Mod: QW

## 2022-08-19 RX ORDER — SITAGLIPTIN AND METFORMIN HYDROCHLORIDE 50; 1000 MG/1; MG/1
50-1000 TABLET, FILM COATED, EXTENDED RELEASE ORAL
Qty: 180 | Refills: 3 | Status: DISCONTINUED | COMMUNITY
Start: 2022-03-31 | End: 2022-08-19

## 2022-08-19 RX ORDER — PIOGLITAZONE HYDROCHLORIDE 15 MG/1
15 TABLET ORAL DAILY
Qty: 90 | Refills: 3 | Status: DISCONTINUED | COMMUNITY
Start: 2022-08-19 | End: 2022-08-19

## 2022-08-19 NOTE — REVIEW OF SYSTEMS
[Fatigue] : no fatigue [Decreased Appetite] : appetite not decreased [Chest Pain] : no chest pain [Palpitations] : no palpitations [Shortness Of Breath] : no shortness of breath [Nausea] : no nausea [Constipation] : no constipation [Vomiting] : no vomiting [Diarrhea] : no diarrhea

## 2022-08-19 NOTE — ASSESSMENT
[FreeTextEntry1] : Patient is a 61 yo man with type 2 diabetes, HLD, HTN and thyroid nodule here for follow up\par \par 1. Type 2 diabetes\par -POCT A1c today is 8.1% higher than previous, confirm with serum \par -discussed the risks of uncontrolled diabetes at length and offered nutrition counseling. He had telehealth nutritional counseling but states it was not helpful\par -patient does not check fingersticks; encouraged him to do so but he will not\par -check serum A1c, serum BMP and repeat microalbumin/creatinine ratio at next visit\par -Continue with Kombiglyze. Stop jardiance. Offered pioglitazone but his mother  from bladder cancer.  As such, we can do glipizide 5 mg BID- hypoglycemia risk discussed\par -diet is high in sugars and calories; has sweet tooth cravings.  Offered education about healthier sweet options like fruits, perhaps a little peanut butter but avoid cookies and desserts\par \par 2. Left thyroid nodule\par -patient has a 1 cm isoechoic right thyroid nodule with a left lobe that is homogenous no distinct nodule\par -repeat thyroid US; referrals have been given to him but he has yet to complete the study. Update referral provided today\par -clinically euthyroid\par \par 3. HLD\par -continue atorvastatin 40 mg\par -LDL goal <70\par -check lipid profile today\par \par Follow up in 3 months\par  [Long Term Vascular Complications] : long term vascular complications of diabetes [Carbohydrate Consistent Diet] : carbohydrate consistent diet [Importance of Diet and Exercise] : importance of diet and exercise to improve glycemic control, achieve weight loss and improve cardiovascular health [Hypoglycemia Management] : hypoglycemia management [Self Monitoring of Blood Glucose] : self monitoring of blood glucose [Retinopathy Screening] : Patient was referred to ophthalmology for retinopathy screening

## 2022-08-19 NOTE — HISTORY OF PRESENT ILLNESS
[FreeTextEntry1] : Patient is a 61 yo man with type 2 diabetes and thyroid nodule here for follow up\par \par Diabetes diagnosed around 2010 on routine labs. \par Current diabetes medicine is Kombiglyze (saxagliptin/metformin) twice a day and jardiance 10 mg daily. \par Does not check sugars, knows how to check sugars in theory. States "I just can't"\par Diet: was eating healthier\par Breakfast: oatmeal with strawberries \par Snacks: apple, banana; chocolate chip muffin, Oreo cookies\par Will have ice cream and desserts more recently.  He does not really eat vegetables\par Up to date with colonoscopy and age appropriate cancer screening as deemed by PCP\par Dilated eye exam: December 2021\par Four beers a week\par \par HLD: \par LDL in December was above the goal of 70 at 135 and atorvastatin was increased from 10 to 40 mg. States he is taking 1/2 pill and will start to take 40 mg from today\par \par Thyroid nodule\par Patient with a 1 x 0.7 x 0.9 cm isoechoic right midpole thyroid nodule\par Clinically and chemically euthyroid in December 2020 \par Referral for annual surveillance US was given at the last visit but he has not completed the test

## 2022-08-19 NOTE — PHYSICAL EXAM
[Alert] : alert [Well Nourished] : well nourished [Healthy Appearance] : healthy appearance [No Acute Distress] : no acute distress [EOMI] : extra ocular movement intact [Normal Hearing] : hearing was normal [Thyroid Not Enlarged] : the thyroid was not enlarged [No Respiratory Distress] : no respiratory distress [No Accessory Muscle Use] : no accessory muscle use [Clear to Auscultation] : lungs were clear to auscultation bilaterally [Normal S1, S2] : normal S1 and S2 [Normal Rate] : heart rate was normal [No Edema] : no peripheral edema [Normal Bowel Sounds] : normal bowel sounds [Not Tender] : non-tender [Soft] : abdomen soft [Normal Gait] : normal gait [Normal Strength/Tone] : muscle strength and tone were normal [Right foot was examined, including] : right foot ~C was examined, including visual inspection with sensory and pulse exams [Left foot was examined, including] : left foot ~C was examined, including visual inspection with sensory and pulse exams [2+] : 2+ in the dorsalis pedis [No Motor Deficits] : the motor exam was normal [Normal Affect] : the affect was normal [Normal Insight/Judgement] : insight and judgment were intact [Normal Mood] : the mood was normal [Foot Ulcers] : no foot ulcers [Swelling] : not swollen [Erythema] : not erythematous [#1 Diminished] : number 1 was normal [#2 Diminished] : number 2 was normal [#3 Diminished] : number 3 was normal [#4 Diminished] : number 4 was normal [#5 Diminished] : number 5 was normal [#6 Diminished] : number 6 was normal [#7 Diminished] : number 7 was normal [#8 Diminished] : number 8 was normal [#9 Diminished] : number 9 was normal [#10 Diminished] : number 10 was normal

## 2022-08-19 NOTE — ASSESSMENT
[Diabetes Foot Care] : diabetes foot care [Long Term Vascular Complications] : long term vascular complications of diabetes [Carbohydrate Consistent Diet] : carbohydrate consistent diet [Importance of Diet and Exercise] : importance of diet and exercise to improve glycemic control, achieve weight loss and improve cardiovascular health [Self Monitoring of Blood Glucose] : self monitoring of blood glucose [Retinopathy Screening] : Patient was referred to ophthalmology for retinopathy screening [FreeTextEntry1] : Patient is a 63 yo man with type 2 diabetes, HLD, HTN and thyroid nodule here for follow up\par \par 1. Type 2 diabetes\par -POCT A1c today is 7.3%\par -discussed the risks of uncontrolled diabetes at length and offered nutrition counseling. He had telehealth nutritional counseling but states it was not helpful\par -patient does not check fingersticks; encouraged him to do so but he will not\par -check serum A1c, serum BMP and repeat microalbumin/creatinine ratio at next visit\par -Continue with Kombiglyze and Jardiance. \par -patient and wife often disagree about weight concerns. Patient states he has always been skinny and works out a lot. Meanwhile, wife is concerned that he is "too skinny."  They can discuss further with PCP.  Patient is without any symptoms and/or concerns regarding his weight at this time\par \par 2. Left thyroid nodule\par -patient has a 1 cm isoechoic right thyroid nodule with a left lobe that is homogenous no distinct nodule\par -repeat thyroid US; referrals have been given to him but he has yet to complete the study. Update referral provided today\par -clinically euthyroid\par \par 3. HLD\par -continue atorvastatin 40 mg\par -LDL goal <70\par \par Follow up in 4 months\par

## 2022-08-19 NOTE — PHYSICAL EXAM
[Alert] : alert [Well Nourished] : well nourished [Healthy Appearance] : healthy appearance [No Acute Distress] : no acute distress [EOMI] : extra ocular movement intact [Normal Hearing] : hearing was normal [Thyroid Not Enlarged] : the thyroid was not enlarged [No Respiratory Distress] : no respiratory distress [No Accessory Muscle Use] : no accessory muscle use [Clear to Auscultation] : lungs were clear to auscultation bilaterally [Normal S1, S2] : normal S1 and S2 [Normal Rate] : heart rate was normal [No Edema] : no peripheral edema [Normal Bowel Sounds] : normal bowel sounds [Not Tender] : non-tender [Soft] : abdomen soft [Normal Gait] : normal gait [Normal Strength/Tone] : muscle strength and tone were normal [Foot Ulcers] : no foot ulcers [Right foot was examined, including] : right foot ~C was examined, including visual inspection with sensory and pulse exams [Left foot was examined, including] : left foot ~C was examined, including visual inspection with sensory and pulse exams [Swelling] : not swollen [Erythema] : not erythematous [2+] : 2+ in the dorsalis pedis [#1 Diminished] : number 1 was normal [#2 Diminished] : number 2 was normal [#3 Diminished] : number 3 was normal [#4 Diminished] : number 4 was normal [#5 Diminished] : number 5 was normal [#6 Diminished] : number 6 was normal [#7 Diminished] : number 7 was normal [#8 Diminished] : number 8 was normal [#9 Diminished] : number 9 was normal [#10 Diminished] : number 10 was normal [No Motor Deficits] : the motor exam was normal [Normal Affect] : the affect was normal [Normal Insight/Judgement] : insight and judgment were intact [Normal Mood] : the mood was normal

## 2022-08-19 NOTE — HISTORY OF PRESENT ILLNESS
[FreeTextEntry1] : Patient is a 63 yo man with type 2 diabetes and thyroid nodule here for follow up\par \par Diabetes diagnosed around 2010 on routine labs. \par Current diabetes medicine is Kombiglyze (saxagliptin/metformin) twice a day and jardiance 10 mg daily. He ran out of jardiance one week ago but does not like the urinary frequency. He believes it is contributing to weight loss as well\par Does not check sugars, knows how to check sugars in theory. States "I just can't"\par Diet: was eating healthier until this summer. eating Oreos, muffins, etc.\par Breakfast: oatmeal with strawberries \par Snacks: apple, banana; chocolate chip muffin, Oreo cookies\par Will have ice cream and desserts more recently. He does not really eat vegetables\par Up to date with colonoscopy and age appropriate cancer screening as deemed by PCP\par Dilated eye exam: December 2021\par Four beers a week along with vodka\par \par HLD: \par LDL in December was above the goal of 70 at 135 and atorvastatin was increased from 10 to 40 mg. States he is taking 1/2 pill and will start to take 40 mg from today\par \par Thyroid nodule\par Patient with a 1 x 0.7 x 0.9 cm isoechoic right midpole thyroid nodule\par Clinically and chemically euthyroid in December 2020 \par Referral for annual surveillance US was given at the last visit but he has not completed the test \par

## 2022-08-19 NOTE — REVIEW OF SYSTEMS
[Polyuria] : polyuria [Fatigue] : no fatigue [Decreased Appetite] : appetite not decreased [Recent Weight Gain (___ Lbs)] : no recent weight gain [Recent Weight Loss (___ Lbs)] : no recent weight loss [Visual Field Defect] : no visual field defect [Dry Eyes] : no dryness [Dysphagia] : no dysphagia [Neck Pain] : no neck pain [Dysphonia] : no dysphonia [Nasal Congestion] : no nasal congestion [Chest Pain] : no chest pain [Slow Heart Rate] : heart rate is not slow [Palpitations] : no palpitations [Fast Heart Rate] : heart rate is not fast [Shortness Of Breath] : no shortness of breath [Cough] : no cough [Nausea] : no nausea [Constipation] : no constipation [Vomiting] : no vomiting [Diarrhea] : no diarrhea [Hesistancy] : no hesitancy [Headaches] : no headaches [Tremors] : no tremors

## 2022-08-22 LAB
ANION GAP SERPL CALC-SCNC: 12 MMOL/L
BUN SERPL-MCNC: 23 MG/DL
CALCIUM SERPL-MCNC: 10.4 MG/DL
CHLORIDE SERPL-SCNC: 100 MMOL/L
CHOLEST SERPL-MCNC: 141 MG/DL
CO2 SERPL-SCNC: 26 MMOL/L
CREAT SERPL-MCNC: 0.85 MG/DL
CREAT SPEC-SCNC: 108 MG/DL
EGFR: 97 ML/MIN/1.73M2
ESTIMATED AVERAGE GLUCOSE: 192 MG/DL
GLUCOSE SERPL-MCNC: 153 MG/DL
HBA1C MFR BLD HPLC: 8.3 %
HDLC SERPL-MCNC: 52 MG/DL
LDLC SERPL CALC-MCNC: 69 MG/DL
MICROALBUMIN 24H UR DL<=1MG/L-MCNC: 1.2 MG/DL
MICROALBUMIN/CREAT 24H UR-RTO: 11 MG/G
NONHDLC SERPL-MCNC: 89 MG/DL
POTASSIUM SERPL-SCNC: 4.2 MMOL/L
SODIUM SERPL-SCNC: 138 MMOL/L
TRIGL SERPL-MCNC: 104 MG/DL

## 2022-10-29 ENCOUNTER — APPOINTMENT (OUTPATIENT)
Dept: ULTRASOUND IMAGING | Facility: CLINIC | Age: 62
End: 2022-10-29

## 2022-10-29 ENCOUNTER — OUTPATIENT (OUTPATIENT)
Dept: OUTPATIENT SERVICES | Facility: HOSPITAL | Age: 62
LOS: 1 days | End: 2022-10-29
Payer: COMMERCIAL

## 2022-10-29 DIAGNOSIS — E04.1 NONTOXIC SINGLE THYROID NODULE: ICD-10-CM

## 2022-10-29 PROCEDURE — 76536 US EXAM OF HEAD AND NECK: CPT | Mod: 26

## 2022-10-29 PROCEDURE — 76536 US EXAM OF HEAD AND NECK: CPT

## 2022-11-01 ENCOUNTER — NON-APPOINTMENT (OUTPATIENT)
Age: 62
End: 2022-11-01

## 2022-11-11 ENCOUNTER — APPOINTMENT (OUTPATIENT)
Dept: ENDOCRINOLOGY | Facility: CLINIC | Age: 62
End: 2022-11-11

## 2023-04-28 ENCOUNTER — APPOINTMENT (OUTPATIENT)
Dept: ENDOCRINOLOGY | Facility: CLINIC | Age: 63
End: 2023-04-28
Payer: MEDICARE

## 2023-04-28 VITALS
TEMPERATURE: 97.8 F | HEIGHT: 71 IN | BODY MASS INDEX: 24.64 KG/M2 | DIASTOLIC BLOOD PRESSURE: 78 MMHG | HEART RATE: 68 BPM | SYSTOLIC BLOOD PRESSURE: 134 MMHG | OXYGEN SATURATION: 98 % | WEIGHT: 176 LBS

## 2023-04-28 DIAGNOSIS — E11.9 TYPE 2 DIABETES MELLITUS W/OUT COMPLICATIONS: ICD-10-CM

## 2023-04-28 DIAGNOSIS — E04.1 NONTOXIC SINGLE THYROID NODULE: ICD-10-CM

## 2023-04-28 DIAGNOSIS — E78.00 PURE HYPERCHOLESTEROLEMIA, UNSPECIFIED: ICD-10-CM

## 2023-04-28 LAB — HBA1C MFR BLD HPLC: 7.5

## 2023-04-28 PROCEDURE — 99214 OFFICE O/P EST MOD 30 MIN: CPT | Mod: 25

## 2023-04-28 PROCEDURE — 83036 HEMOGLOBIN GLYCOSYLATED A1C: CPT | Mod: QW

## 2023-04-28 RX ORDER — GLIPIZIDE 5 MG/1
5 TABLET ORAL
Qty: 180 | Refills: 3 | Status: ACTIVE | COMMUNITY
Start: 2022-08-19 | End: 1900-01-01

## 2023-04-28 RX ORDER — ESCITALOPRAM OXALATE 5 MG/1
TABLET, FILM COATED ORAL
Refills: 0 | Status: ACTIVE | COMMUNITY

## 2023-04-28 NOTE — REVIEW OF SYSTEMS
[Fatigue] : no fatigue [Decreased Appetite] : appetite not decreased [Dysphagia] : no dysphagia [Dysphonia] : no dysphonia [Chest Pain] : no chest pain [Slow Heart Rate] : heart rate is not slow [Palpitations] : no palpitations [Fast Heart Rate] : heart rate is not fast [Shortness Of Breath] : no shortness of breath [Cough] : no cough [Nausea] : no nausea [Constipation] : no constipation [Vomiting] : no vomiting [Diarrhea] : no diarrhea [Cold Intolerance] : no cold intolerance [Heat Intolerance] : no heat intolerance

## 2023-04-28 NOTE — HISTORY OF PRESENT ILLNESS
[FreeTextEntry1] : Patient is a 64 yo man with type 2 diabetes and thyroid nodule here for follow up\par \par Diabetes diagnosed around 2010 on routine labs. \par Current diabetes medicine is Kombiglyze (saxagliptin/metformin) twice a day and jardiance 10 mg daily. He ran out of jardiance one and does not like the urinary frequency. He believes it is contributing to weight loss as well. Based on an elevated A1c, glipizide was restarted. Since being off of the jardiance he feels much better\par Currently takes Kimbiglyze and glipizide 5 mg BID\par Does not check sugars and will not do it\par Diet: was eating healthier until this summer. eating Oreos, muffins, etc.\par Breakfast: oatmeal with strawberries \par Lunch: salads with honey mustard dressing; sandwich\par Avoids bread with dinner. Eats out at least three times a week.  Home cooked meals about 3 nights a week\par Snacks: apple, banana; chocolate chip muffin, Oreo cookies\par Will have ice cream and desserts more recently\par He has a  three times a week, plays pickle ball\par Dilated eye exam: December 2021\par Four beers a week along with vodka\par \par HLD: \par LDL in December was above the goal of 70 at 135 and atorvastatin was increased from 10 to 40 mg. States he is taking 1/2 pill and will start to take 40 mg from today\par \par Thyroid nodule\par 2020: Patient with a 1 x 0.7 x 0.9 cm isoechoic right midpole thyroid nodule\par Clinically and chemically euthyroid in December 2020 \par Surveillance US stable November 2022

## 2023-04-28 NOTE — ASSESSMENT
[Diabetes Foot Care] : diabetes foot care [Long Term Vascular Complications] : long term vascular complications of diabetes [Carbohydrate Consistent Diet] : carbohydrate consistent diet [Importance of Diet and Exercise] : importance of diet and exercise to improve glycemic control, achieve weight loss and improve cardiovascular health [Hypoglycemia Management] : hypoglycemia management [Self Monitoring of Blood Glucose] : self monitoring of blood glucose [Retinopathy Screening] : Patient was referred to ophthalmology for retinopathy screening [FreeTextEntry1] : Patient is a 64 yo man with type 2 diabetes, HLD, HTN and thyroid nodule here for follow up\par \par 1. Type 2 diabetes\par -POCT A1c today is 7.5% and improved; confirm with serum levels today\par -discussed the risks of uncontrolled diabetes at length and offered nutrition counseling. He had telehealth nutritional counseling but states it was not helpful\par -patient does not check fingersticks; encouraged him to do so but he will not\par -check serum A1c, serum BMP and repeat microalbumin/creatinine ratio at next visit\par -Continue with Kombiglyze and glipizide 5 mg BID- hypoglycemia risk discussed\par -diet is high in sugars and calories; has sweet tooth cravings. Offered education about healthier sweet options like fruits, perhaps a little peanut butter but avoid cookies and desserts\par -he was started on lexapro and is experiencing ED.  Recommend urology consultation\par \par 2. Left thyroid nodule\par -patient has a 1 cm isoechoic right thyroid nodule with a left lobe that is homogenous no distinct nodule\par -repeat thyroid US in 2022 were stable\par -next US fall/winter 2023\par -clinically euthyroid\par \par 3. HLD\par -continue atorvastatin 40 mg\par -LDL goal <70\par -check lipid profile today\par \par Follow up in October\par

## 2023-05-01 LAB
ALBUMIN SERPL ELPH-MCNC: 4.9 G/DL
ALP BLD-CCNC: 89 U/L
ALT SERPL-CCNC: 32 U/L
ANION GAP SERPL CALC-SCNC: 12 MMOL/L
AST SERPL-CCNC: 27 U/L
BILIRUB SERPL-MCNC: 0.8 MG/DL
BUN SERPL-MCNC: 14 MG/DL
CALCIUM SERPL-MCNC: 10 MG/DL
CHLORIDE SERPL-SCNC: 99 MMOL/L
CHOLEST SERPL-MCNC: 145 MG/DL
CO2 SERPL-SCNC: 27 MMOL/L
CREAT SERPL-MCNC: 0.91 MG/DL
CREAT SPEC-SCNC: 112 MG/DL
EGFR: 94 ML/MIN/1.73M2
ESTIMATED AVERAGE GLUCOSE: 194 MG/DL
GLUCOSE SERPL-MCNC: 170 MG/DL
HBA1C MFR BLD HPLC: 8.4 %
HDLC SERPL-MCNC: 49 MG/DL
LDLC SERPL CALC-MCNC: 76 MG/DL
MICROALBUMIN 24H UR DL<=1MG/L-MCNC: 2.3 MG/DL
MICROALBUMIN/CREAT 24H UR-RTO: 21 MG/G
NONHDLC SERPL-MCNC: 96 MG/DL
POTASSIUM SERPL-SCNC: 4.3 MMOL/L
PROT SERPL-MCNC: 7.2 G/DL
SODIUM SERPL-SCNC: 138 MMOL/L
TRIGL SERPL-MCNC: 102 MG/DL

## 2023-05-01 RX ORDER — SAXAGLIPTIN AND METFORMIN HYDROCHLORIDE 5; 1000 MG/1; MG/1
5-1000 TABLET, FILM COATED, EXTENDED RELEASE ORAL
Qty: 180 | Refills: 3 | Status: DISCONTINUED | COMMUNITY
End: 2023-05-01

## 2023-05-16 RX ORDER — ATORVASTATIN CALCIUM 40 MG/1
40 TABLET, FILM COATED ORAL
Qty: 90 | Refills: 3 | Status: ACTIVE | COMMUNITY
Start: 2022-03-16 | End: 1900-01-01

## 2023-07-28 ENCOUNTER — RX RENEWAL (OUTPATIENT)
Age: 63
End: 2023-07-28

## 2023-10-04 ENCOUNTER — APPOINTMENT (OUTPATIENT)
Dept: ENDOCRINOLOGY | Facility: CLINIC | Age: 63
End: 2023-10-04

## 2023-10-27 NOTE — HISTORY OF PRESENT ILLNESS
Bed: ED31  Expected date: 10/26/23  Expected time: 5:23 PM  Means of arrival:   Comments:  Triage  
Pt is able to ambulate independently. Has not been vomiting, able to tolerated Po challenge with apple sauce and juice. VSS, verbalized symptom improved.   
Pt is still very nausea when rounding pt. Burping a lot a with gas along with abd pain. VSS  
[FreeTextEntry1] : Patient is a 62 yo man with type 2 diabetes and thyroid nodule here for follow up\par \par Diabetes diagnosed around 2010 on routine labs. \par Dilated eye exam: early retinopathy earlier this year; he followed up three months ago and ophthalmology didn't mention it, unsure if it resolved or not\par Current diabetes medicine is Kombiglyze (saxaglipitin/metformin) twice a day and glipizide XL.  Does not take glipizide regularly and only takes it with certain meals. Had tried Jardiance in the past but caused increase urination. Ran out of kombiglyze for five days.\par Does not check sugars, knows how to check sugars in theory. States "I just can't"\par Since the last visit, overall diet has improved. Cut out cookies but still eating out at restaurants frequently. Over the summer, there has been more social gatherings and he might indulge during these times\par Breakfast: black coffee with milk, no sweetener, apple\par Lunch: home made sandwich, crispy chicken sandwich from a cafe; occasional [place] [place] a twice a month without french fries. Ate about 1/2 the fries and wife ate the other half\par Dinner: lentil soup, Hawaiian chicken\par Snacks: apple, banana\par Eats out at almost all meals\par Patient does not cook, wife cooks but stopped cooking over the summer.  Likes eating out at restaurants\par Up to date with colonoscopy and age appropriate cancer screening as deemed by PCP\par \par HLD: \par LDL in December was above the goal of 70 at 135 and atorvastatin was increased from 10 to 40 mg. States he is taking 1/2 pill and will start to take 40 mg from today\par \par Thyroid nodule\par Patient with a 1 x 0.7 x 0.9 cm isoechoic right midpole thyroid nodule\par Clinically and chemically euthyroid in December 2020 \par

## 2023-12-11 ENCOUNTER — RX RENEWAL (OUTPATIENT)
Age: 63
End: 2023-12-11

## 2023-12-11 RX ORDER — SITAGLIPTIN AND METFORMIN HYDROCHLORIDE 50; 1000 MG/1; MG/1
50-1000 TABLET, FILM COATED ORAL TWICE DAILY
Qty: 180 | Refills: 0 | Status: ACTIVE | COMMUNITY
Start: 2023-05-01 | End: 1900-01-01

## 2025-01-27 NOTE — ASSESSMENT
[Long Term Vascular Complications] : long term vascular complications of diabetes [Carbohydrate Consistent Diet] : carbohydrate consistent diet [Importance of Diet and Exercise] : importance of diet and exercise to improve glycemic control, achieve weight loss and improve cardiovascular health [Exercise/Effect on Glucose] : exercise/effect on glucose [Self Monitoring of Blood Glucose] : self monitoring of blood glucose [Retinopathy Screening] : Patient was referred to ophthalmology for retinopathy screening [FreeTextEntry1] : Patient is a 62 yo man with type 2 diabetes, HLD, HTN and thyroid nodule here for follow up\par \par 1. Type 2 diabetes\par -POCT A1c today is 8.0% improved from last visit yet not at goal\par -discussed the risks of uncontrolled diabetes at length and offered nutrition counseling. He had telehealth nutritional counseling but states it was not helpful\par -patient does not check fingersticks; encouraged him to do so but he will not\par -states that he was watching diet strictly for a few months but had more liberal food choices.  Eating healthy for him with limited carbs has been the main struggle\par -check serum A1c, serum CMP and repeat microalbumin/creatinine ratio at next visit\par -to date, behavioral modifications trials has not aided in achieving glycemic goals\par -Continue with Kombiglyze and jardiance 10 mg. Offered basal insulin and he has declined today\par \par 2. Left thyroid nodule\par -patient has a 1 cm isoechoic right thyroid nodule with a left lobe that is homogenous no distinct nodule\par -repeat thyroid US at this time, referral provided today\par -clinically euthyroid\par -check TSH with reflex today\par \par 3. HLD\par -continue atorvastatin 40 mg\par -LDL goal <70\par \par 4. HTN\par -BP goal < 140/90\par \par Follow up in 3 months, sooner if needed.  None known